# Patient Record
Sex: MALE | Race: BLACK OR AFRICAN AMERICAN | NOT HISPANIC OR LATINO
[De-identification: names, ages, dates, MRNs, and addresses within clinical notes are randomized per-mention and may not be internally consistent; named-entity substitution may affect disease eponyms.]

---

## 2018-01-07 ENCOUNTER — RECORDS - HEALTHEAST (OUTPATIENT)
Dept: ADMINISTRATIVE | Facility: OTHER | Age: 33
End: 2018-01-07

## 2019-03-01 ENCOUNTER — OFFICE VISIT - HEALTHEAST (OUTPATIENT)
Dept: FAMILY MEDICINE | Facility: CLINIC | Age: 34
End: 2019-03-01

## 2019-03-01 DIAGNOSIS — K52.9 GASTROENTERITIS: ICD-10-CM

## 2019-03-01 RX ORDER — LOPERAMIDE HYDROCHLORIDE 2 MG/1
TABLET ORAL
Qty: 12 TABLET | Refills: 0 | Status: SHIPPED | OUTPATIENT
Start: 2019-03-01

## 2021-05-31 VITALS — WEIGHT: 223 LBS

## 2021-06-02 VITALS — WEIGHT: 224 LBS

## 2021-06-17 NOTE — PATIENT INSTRUCTIONS - HE
Patient Instructions by Fred Eagle PA-C at 3/1/2019  4:50 PM     Author: Fred Eagel PA-C Service: -- Author Type: Physician Assistant    Filed: 3/1/2019  5:24 PM Encounter Date: 3/1/2019 Status: Addendum    : Fred Eagle PA-C (Physician Assistant)    Related Notes: Original Note by Fred Eagle PA-C (Physician Assistant) filed at 3/1/2019  5:24 PM       HOME TREATMENT OF VOMITING AND DIARRHEA    These are guidelines, not rigid rules.  If the patient is not doing well, or if you have questions, do not hesitate to call.    Vomiting     After the first vomiting episode, do not give any food or drink for 2-3 hours.  Then start frequent sips of clear liquids-the amount ranges from 1 or 2 teaspoons for children (or a few ice chips) to 2 or 4 tablespoons for adults-every 15 minutes.  Dont give any solid foods.    Clear liquid means anything you can see through.  They should not have any caffeine or carbonation.  Avoid apple juice as it sometimes worsens diarrhea.  For infants and children under two, use an electrolyte replacement like Pedialyte-either liquid or popsicle form.  For older children and adults, try water, Gatorade, Jell-O water (add double the water), or popsicles.  Some of the clear liquids should contain sugar.    When the vomiting is spaced 3 or 4 hours apart, you may increase the amount of clear liquids.  As that stays down, start nibbles of starchy foods-cheerios or saltine crackers then rice, dry toast or potatoes (without butter).    As soon as the vomiting stops, you can add in regular foods.  We advise no fatty foods or big meals until there has been no vomiting for about 24 hours.  Then the patient can return to normal eating and drinking.    Watch for dehydration.  If a baby cries without tears, or if the patient pees less than 4 times in 24 hours, it is time to call.    Call if there is vomiting of blood, sleeping all the time, extreme weakness, severe abdominal pain, a baby or child  wont smile or play at all, or if there are any other concerns.    Diarrhea     We now know that the sooner we get to a normal diet, the better.      If the patient has vomiting with diarrhea, follow the above recommendations until the vomiting has spaced out.      Then add starches and bland food like bananas, pasta, and oatmeal.  For infants, you may continue breastfeeding.  If on formula, try a soy formula for a while instead of a lactose based formula.  You may also try baby cereal mixed with water or juice other than apple.  If this is tolerated, you may add cooked fruits and vegetables and advance to regular food as soon as possible.      You may find that cows milk and apple juice worsen diarrhea, as do fatty foods so add these carefully.  You may try a soy based milk for a few days instead of regular cows milk.  Make sure the patient gets plenty of fluids too.  For children, a fluid replacement like Pedialyte in liquid form or popsicles is a good choice.     If there is no vomiting, you may continue with regular food, avoiding milk products and apple juice.  If this is not tolerated well, follow the above guidelines starting with starches.    We do not advise the use of medications to stop diarrhea in infants or children.    Watch for dehydration, as with vomiting.  Call for any symptoms listed in that section, or if there is any blood in the stools.  Call for any other concerns as well.      Patient Education     Viral Gastroenteritis (Adult)    Gastroenteritis is commonly called the stomach flu. It is most often caused by a virus that affects the stomach and intestinal tract and usually lasts from 2 to 7 days. Common viruses causing gastroenteritis include norovirus, rotavirus, and hepatitis A. Non-viral causes of gastroenteritis include bacteria, parasites, and toxins.  The danger from repeated vomiting or diarrhea is dehydration. This is the loss of too much fluid from the body. When this occurs, body  fluids must be replaced. Antibiotics do not help with this illness because it is usually viral.Simple home treatment will be helpful.  Symptoms of viral gastroenteritis may include:    Watery, loose stools    Stomach pain or abdominal cramps    Fever and chills    Nausea and vomiting    Loss of bowel control    Headache  Home care  Gastroenteritis is transmitted by contact with the stool or vomit of an infected person. This can occur from person to person or from contact with a contaminated surface.  Follow these guidelines when caring for yourself at home:    If symptoms are severe, rest at home for the next 24 hours or until you are feeling better.    Wash your hands with soap and water or use alcohol-based  to prevent the spread of infection. Wash your hands after touching anyone who is sick.    Wash your hands or use alcohol-based  after using the toilet and before meals. Clean the toilet after each use.  Remember these tips when preparing food:    People with diarrhea should not prepare or serve food to others. When preparing foods, wash your hands before and after.    Wash your hands after using cutting boards, countertops, knives, or utensils that have been in contact with raw food.    Keep uncooked meats away from cooked and ready-to-eat foods.  Medicine  You may use acetaminophen or NSAID medicines like ibuprofen or naproxen to control fever unless another medicine was given. If you have chronic liver or kidney disease, talk with your healthcare provider before using these medicines. Also talk with your provider if you've had a stomach ulcer or gastrointestinal bleeding. Don't give aspirin to anyone under 18 years of age who is ill with a fever. It may cause severe liver damage. Don't use NSAIDS is you are already taking one for another condition (like arthritis) or are on aspirin (such as for heart disease or after a stroke).  If medicine for vomiting or diarrhea are prescribed, take  these only as directed. Do not take over-the-counter medicines for vomiting or diarrhea unless instructed by your healthcare provider.  Diet  Follow these guidelines for food:    Water and liquids are important so you don't get dehydrated. Drink a small amount at a time or suck on ice chips if you are vomiting.    If you eat, avoid fatty, greasy, spicy, or fried foods.    Don't eat dairy if you have diarrhea. This can make diarrhea worse.    Avoid tobacco, alcohol, and caffeine which may worsen symptoms.  During the first 24 hours (the first full day), follow the diet below:    Beverages. Sports drinks, soft drinks without caffeine, ginger ale, mineral water (plain or flavored), decaffeinated tea and coffee. If you are very dehydrated, sports drinks aren't a good choice. They have too much sugar and not enough electrolytes. In this case, commercially available products called oral rehydration solutions, are best.    Soups. Eat clear broth, consommé, and bouillon.    Desserts. Eat gelatin, popsicles, and fruit juice bars.  During the next 24 hours (the second day), you may add the following to the above:    Hot cereal, plain toast, bread, rolls, and crackers    Plain noodles, rice, mashed potatoes, chicken noodle or rice soup    Unsweetened canned fruit (avoid pineapple), bananas    Limit fat intake to less than 15 grams per day. Do this by avoiding margarine, butter, oils, mayonnaise, sauces, gravies, fried foods, peanut butter, meat, poultry, and fish.    Limit fiber and avoid raw or cooked vegetables, fresh fruits (except bananas), and bran cereals.    Limit caffeine and chocolate. Don't use spices or seasonings other than salt.    Limit dairy products.    Avoid alcohol.  During the next 24 hours:    Gradually resume a normal diet as you feel better and your symptoms improve.    If at any time it starts getting worse again, go back to clear liquids until you feel better.  Follow-up care  Follow up with your  healthcare provider, or as advised. Call your provider if you don't get better within 24 hours or if diarrhea lasts more than a week. Also follow up if you are unable to keep down liquids and get dehydrated. If a stool (diarrhea) sample was taken, call as directed for the results.  Call 911  Call 911 if any of these occur:    Trouble breathing    Chest pain    Confused    Severe drowsiness or trouble awakening    Fainting or loss of consciousness    Rapid heart rate    Seizure    Stiff neck  When to seek medical advice  Call your healthcare provider right away if any of these occur:    Abdominal pain that gets worse    Continued vomiting (unable to keep liquids down)    Frequent diarrhea (more than 5 times a day)    Blood in vomit or stool (black or red color)    Dark urine, reduced urine output, or extreme thirst    Weakness or dizziness    Drowsiness    Fever of 100.4 F (38 C) or higher, or as directed by your healthcare provider    New rash  Date Last Reviewed: 1/3/2016    3175-2471 [employer]. [address] [place] [address], [place] [address]. All rights reserved. This information is not intended as a substitute for professional medical care. Always follow your healthcare professional's instructions.

## 2021-06-18 NOTE — LETTER
Letter by Fred Eagle PA-C at      Author: Fred Eagle PA-C Service: -- Author Type: --    Filed:  Encounter Date: 3/1/2019 Status: (Other)       March 1, 2019     Patient: Ricky Ellsworth   YOB: 1985   Date of Visit: 3/1/2019       To Whom It May Concern:    It is my medical opinion that Ricky Ellsworth should remain out of work until 03/01/2019.    If you have any questions or concerns, please don't hesitate to call.    Sincerely,        Electronically signed by Fred Eagle PA-C

## 2021-06-27 NOTE — PROGRESS NOTES
Progress Notes by Fred Eagle PA-C at 3/1/2019  4:50 PM     Author: Fred Eagle PA-C Service: -- Author Type: Physician Assistant    Filed: 3/1/2019  6:42 PM Encounter Date: 3/1/2019 Status: Addendum    : Fred Eagle PA-C (Physician Assistant)    Related Notes: Original Note by Fred Eagle PA-C (Physician Assistant) filed at 3/1/2019  6:40 PM       Subjective:      Patient ID: Ricky Ellsworth is a 34 y.o. male.    Chief Complaint:    [facility]  Ricky Ellsworth is a 34 y.o. male who presents today complaining of 1 day onset of one bout of emesis and 2 bouts of loose watery stools.  Patient is requesting a note for work today.    Gastroenteritis  Patient complains of vomiting and diarrhea for 1 days.  Patient denies nonbilious vomiting 1 times per day.  He states he has had 2 loose watery stools since 2 AM yesterday morning.   He denies any blood or mucus in the stools or vomitus.  He has not tried any treatment for this at home.  No noted abdominal pain or cramping.    Patient's oral intake has been normal.  Patient's urine output has been adequate. Other contacts with similar symptoms include: none. Patient denies recent travel history. Patient has not had recent ingestion of possible contaminated food, toxic plants, or inappropriate medications/poisons.     No past medical history on file.    No past surgical history on file.    No family history on file.    Social History     Tobacco Use   ? Smoking status: Not on file   Substance Use Topics   ? Alcohol use: Not on file   ? Drug use: Not on file       Review of Systems  As above in HPI, otherwise balance of Review of Systems are negative.    Objective:     /67   Pulse 66   Temp 99.9  F (37.7  C) (Oral)   Resp 18   Wt (!) 224 lb (101.6 kg)   SpO2 98%     Physical Exam  General: Patient is resting comfortably no acute distress is afebrile  HEENT: Head is normocephalic atraumatic   eyes are PERRL EOMI sclera anicteric   Conjunctive are  without pallor  TMs are clear bilaterally  Throat is without pharyngeal wall erythema and no exudate  Oral mucous membranes are moist  No cervical lymphadenopathy present  LUNGS: Clear to auscultation bilaterally  HEART: Regular rate and rhythm  Abdomen: Normoactive bowel sounds x4.  No rebound no guarding no masses no pain at McBurney's point.  Skin: Without rash non-diaphoretic capillary refill is immediate less than 1 second      Assessment:     Procedures    The encounter diagnosis was Gastroenteritis.    Plan:     1. Gastroenteritis  loperamide (IMODIUM A-D) 2 mg tablet    DISCONTINUED: loperamide (IMODIUM A-D) 2 mg tablet         Patient Instructions   HOME TREATMENT OF VOMITING AND DIARRHEA    These are guidelines, not rigid rules.  If the patient is not doing well, or if you have questions, do not hesitate to call.    Vomiting     After the first vomiting episode, do not give any food or drink for 2-3 hours.  Then start frequent sips of clear liquids-the amount ranges from 1 or 2 teaspoons for children (or a few ice chips) to 2 or 4 tablespoons for adults-every 15 minutes.  Dont give any solid foods.    Clear liquid means anything you can see through.  They should not have any caffeine or carbonation.  Avoid apple juice as it sometimes worsens diarrhea.  For infants and children under two, use an electrolyte replacement like Pedialyte-either liquid or popsicle form.  For older children and adults, try water, Gatorade, Jell-O water (add double the water), or popsicles.  Some of the clear liquids should contain sugar.    When the vomiting is spaced 3 or 4 hours apart, you may increase the amount of clear liquids.  As that stays down, start nibbles of starchy foods-cheerios or saltine crackers then rice, dry toast or potatoes (without butter).    As soon as the vomiting stops, you can add in regular foods.  We advise no fatty foods or big meals until there has been no vomiting for about 24 hours.  Then the  patient can return to normal eating and drinking.    Watch for dehydration.  If a baby cries without tears, or if the patient pees less than 4 times in 24 hours, it is time to call.    Call if there is vomiting of blood, sleeping all the time, extreme weakness, severe abdominal pain, a baby or child wont smile or play at all, or if there are any other concerns.    Diarrhea     We now know that the sooner we get to a normal diet, the better.      If the patient has vomiting with diarrhea, follow the above recommendations until the vomiting has spaced out.      Then add starches and bland food like bananas, pasta, and oatmeal.  For infants, you may continue breastfeeding.  If on formula, try a soy formula for a while instead of a lactose based formula.  You may also try baby cereal mixed with water or juice other than apple.  If this is tolerated, you may add cooked fruits and vegetables and advance to regular food as soon as possible.      You may find that cows milk and apple juice worsen diarrhea, as do fatty foods so add these carefully.  You may try a soy based milk for a few days instead of regular cows milk.  Make sure the patient gets plenty of fluids too.  For children, a fluid replacement like Pedialyte in liquid form or popsicles is a good choice.     If there is no vomiting, you may continue with regular food, avoiding milk products and apple juice.  If this is not tolerated well, follow the above guidelines starting with starches.    We do not advise the use of medications to stop diarrhea in infants or children.    Watch for dehydration, as with vomiting.  Call for any symptoms listed in that section, or if there is any blood in the stools.  Call for any other concerns as well.      Patient Education     Viral Gastroenteritis (Adult)    Gastroenteritis is commonly called the stomach flu. It is most often caused by a virus that affects the stomach and intestinal tract and usually lasts from 2 to 7 days.  Common viruses causing gastroenteritis include norovirus, rotavirus, and hepatitis A. Non-viral causes of gastroenteritis include bacteria, parasites, and toxins.  The danger from repeated vomiting or diarrhea is dehydration. This is the loss of too much fluid from the body. When this occurs, body fluids must be replaced. Antibiotics do not help with this illness because it is usually viral.Simple home treatment will be helpful.  Symptoms of viral gastroenteritis may include:    Watery, loose stools    Stomach pain or abdominal cramps    Fever and chills    Nausea and vomiting    Loss of bowel control    Headache  Home care  Gastroenteritis is transmitted by contact with the stool or vomit of an infected person. This can occur from person to person or from contact with a contaminated surface.  Follow these guidelines when caring for yourself at home:    If symptoms are severe, rest at home for the next 24 hours or until you are feeling better.    Wash your hands with soap and water or use alcohol-based  to prevent the spread of infection. Wash your hands after touching anyone who is sick.    Wash your hands or use alcohol-based  after using the toilet and before meals. Clean the toilet after each use.  Remember these tips when preparing food:    People with diarrhea should not prepare or serve food to others. When preparing foods, wash your hands before and after.    Wash your hands after using cutting boards, countertops, knives, or utensils that have been in contact with raw food.    Keep uncooked meats away from cooked and ready-to-eat foods.  Medicine  You may use acetaminophen or NSAID medicines like ibuprofen or naproxen to control fever unless another medicine was given. If you have chronic liver or kidney disease, talk with your healthcare provider before using these medicines. Also talk with your provider if you've had a stomach ulcer or gastrointestinal bleeding. Don't give aspirin to  anyone under 18 years of age who is ill with a fever. It may cause severe liver damage. Don't use NSAIDS is you are already taking one for another condition (like arthritis) or are on aspirin (such as for heart disease or after a stroke).  If medicine for vomiting or diarrhea are prescribed, take these only as directed. Do not take over-the-counter medicines for vomiting or diarrhea unless instructed by your healthcare provider.  Diet  Follow these guidelines for food:    Water and liquids are important so you don't get dehydrated. Drink a small amount at a time or suck on ice chips if you are vomiting.    If you eat, avoid fatty, greasy, spicy, or fried foods.    Don't eat dairy if you have diarrhea. This can make diarrhea worse.    Avoid tobacco, alcohol, and caffeine which may worsen symptoms.  During the first 24 hours (the first full day), follow the diet below:    Beverages. Sports drinks, soft drinks without caffeine, ginger ale, mineral water (plain or flavored), decaffeinated tea and coffee. If you are very dehydrated, sports drinks aren't a good choice. They have too much sugar and not enough electrolytes. In this case, commercially available products called oral rehydration solutions, are best.    Soups. Eat clear broth, consommé, and bouillon.    Desserts. Eat gelatin, popsicles, and fruit juice bars.  During the next 24 hours (the second day), you may add the following to the above:    Hot cereal, plain toast, bread, rolls, and crackers    Plain noodles, rice, mashed potatoes, chicken noodle or rice soup    Unsweetened canned fruit (avoid pineapple), bananas    Limit fat intake to less than 15 grams per day. Do this by avoiding margarine, butter, oils, mayonnaise, sauces, gravies, fried foods, peanut butter, meat, poultry, and fish.    Limit fiber and avoid raw or cooked vegetables, fresh fruits (except bananas), and bran cereals.    Limit caffeine and chocolate. Don't use spices or seasonings other  than salt.    Limit dairy products.    Avoid alcohol.  During the next 24 hours:    Gradually resume a normal diet as you feel better and your symptoms improve.    If at any time it starts getting worse again, go back to clear liquids until you feel better.  Follow-up care  Follow up with your healthcare provider, or as advised. Call your provider if you don't get better within 24 hours or if diarrhea lasts more than a week. Also follow up if you are unable to keep down liquids and get dehydrated. If a stool (diarrhea) sample was taken, call as directed for the results.  Call 911  Call 911 if any of these occur:    Trouble breathing    Chest pain    Confused    Severe drowsiness or trouble awakening    Fainting or loss of consciousness    Rapid heart rate    Seizure    Stiff neck  When to seek medical advice  Call your healthcare provider right away if any of these occur:    Abdominal pain that gets worse    Continued vomiting (unable to keep liquids down)    Frequent diarrhea (more than 5 times a day)    Blood in vomit or stool (black or red color)    Dark urine, reduced urine output, or extreme thirst    Weakness or dizziness    Drowsiness    Fever of 100.4 F (38 C) or higher, or as directed by your healthcare provider    New rash  Date Last Reviewed: 1/3/2016    4330-2646 [employer]. [address] [place] [address], [place] [address]. All rights reserved. This information is not intended as a substitute for professional medical care. Always follow your healthcare professional's instructions.